# Patient Record
(demographics unavailable — no encounter records)

---

## 2025-02-28 NOTE — ADDENDUM
[FreeTextEntry1] : This note was written by Wendi Ruth, acting as the  for Dr. Whiting. This note accurately reflects the work and decisions made by Dr. Whiting.

## 2025-02-28 NOTE — DISCUSSION/SUMMARY
[FreeTextEntry1] : ADAM is a 48 year female who presents for MATEO. On exam, positive CST, normal PVR, stage II rectocele.  We discussed management options including observation, pelvic floor exercises with or without physical therapy, pessary, impressa, medications including imipramine and surgical management including urethral bulking agents, fascial sling, velazco and midurethral sling with mesh. IUGA information for PFE and MATEO given to patient.   [] Anticipate surgery MUS posterior repair - risks/benefits reviewed [] Pelvic sono ordered [] UDS   All questions answered.

## 2025-02-28 NOTE — OB HISTORY
[Last Pap Smear ___] : date of last pap smear was on [unfilled] [Vaginal ___] : [unfilled] vaginal delivery(s) [Definite ___ (Date)] : the last menstrual period was [unfilled] [Abnormal Pap Smear] : normal pap smear [Taking Estrogens] : is not taking estrogen replacement [Sexually Active] : is not sexually active [FreeTextEntry1] : Largest baby 7lbs 14oz

## 2025-02-28 NOTE — DISCUSSION/SUMMARY
[FreeTextEntry1] : ADAM is a 48 year female who presents for MATEO. On exam, positive CST, normal PVR, stage II rectocele.  We discussed management options including observation, pelvic floor exercises with or without physical therapy, pessary, impressa, medications including imipramine and surgical management including urethral bulking agents, fascial sling, velazco and midurethral sling with mesh. IUGA information for PFE and MATEO given to patient.   [] Anticipate surgery MUS posterior repair - risks/benefits reviewed [] Pelvic sono ordered [] UDS   All questions answered. No Repair - Repaired With Adjacent Surgical Defect Text (Leave Blank If You Do Not Want): After obtaining clear surgical margins the defect was repaired concurrently with another surgical defect which was in close approximation.

## 2025-02-28 NOTE — PHYSICAL EXAM
[Chaperone Present] : A chaperone was present in the examining room during all aspects of the physical examination [No Acute Distress] : in no acute distress [Well developed] : well developed [Well Nourished] : ~L well nourished [Oriented x3] : oriented to person, place, and time [No Edema] : ~T edema was not present [Warm and Dry] : was warm and dry to touch [Labia Majora] : were normal [Labia Minora] : were normal [Normal Appearance] : general appearance was normal [Post Void Residual ____ml] : post void residual was [unfilled] ml [Normal] : normal [Soft] :  the cervix was soft [Uterine Adnexae] : were not tender and not enlarged [Ap ____] : Ap [unfilled] [Bp ____] : Bp [unfilled] [FreeTextEntry2] :  Wendi Ruth [Cough] : no cough [Tenderness] : ~T no ~M abdominal tenderness observed [Distended] : not distended [FreeTextEntry3] : pos CST

## 2025-02-28 NOTE — HISTORY OF PRESENT ILLNESS
[Vaginal Wall Prolapse] : no [Rectal Prolapse] : no [Urinary Frequency] : no [Feelings Of Urinary Urgency] : no [x1] : nocturia once nightly [Urinary Tract Infection] : no [Constipation Obstructed Defecation] : no [Pelvic Pain] : no [Vaginal Pain] : no [Unable To Restrain Bowel Movement] : severe [Stool Visible Blood] : mild [] : years ago [FreeTextEntry1] : ADAM is a 48 year female who presents for urinary incontinence. Reports MATEO for years but has gotten progressively worse. Wears liner during the day. Wears a tampon during the winter d/t increased coughing and leakage which helps. She has nocturia at least 1x a night, Her mom and sisters have bladder prolapse. Hx LEEP with normal paps after. Hx b/l salpingectomy. Denies constipation but has fecal urgency.      Daytime frequency: No Nocturia: Yes at least 1x a night Urinary urgency: No Leakage of urine with urgency: No Leakage of urine with coughing sneezing laughing: Yes Incontinence pad use: Yes Sensation of incomplete bladder emptying: No History of frequent urinary tract infections: No History of hematuria: No Previous treatment: None Vaginal symptoms: Denies pelvic pain, Denies bulge Bowel symptoms: Denies constipation      OB: 2  GYN: LMP 2025, Negative pap 2024 - negative HPV,  Hx HPV  - LEEP done, No estrogen use PMH: Fatty liver, Obesity, Pneumonia, HTN, HLD, Constipation  PSH: LEEP, Tubal ligation, B/l salpingectomy Meds: Atorvastatin, Valsartan  Allx: NKDA

## 2025-02-28 NOTE — PROCEDURE
[Straight Catheterization] : insertion of a straight catheter [Patient] : the patient [___ Fr Straight Tip] : a [unfilled] in [place] straight tip catheter [None] : there were no complications with the catheter insertion [Clear] : clear [No Complications] : no complications [Tolerated Well] : the patient tolerated the procedure well [Post procedure instructions and information given] : Post procedure instructions and information were given and reviewed with patient. [0] : 0 [Stress Incontinence] : stress incontinence

## 2025-02-28 NOTE — PROCEDURE
[Straight Catheterization] : insertion of a straight catheter [Patient] : the patient [___ Fr Straight Tip] : a [unfilled] in [place] straight tip catheter [None] : there were no complications with the catheter insertion [Clear] : clear [No Complications] : no complications [Tolerated Well] : the patient tolerated the procedure well [Post procedure instructions and information given] : Post procedure instructions and information were given and reviewed with patient. [0] : 0 [Stress Incontinence] : stress incontinence

## 2025-04-25 NOTE — DISCUSSION/SUMMARY
[FreeTextEntry1] : ADAM is a 48 year female who presents for f/u on MATEO and POP. Here to discuss POST REPAIR SLING CYSTO scheduled for 05/07/2025.   The patient presented to the office today for counseling regarding her decision for possible pelvic reconstructive surgery. All pertinent prior studies including urodynamic were reviewed. 						   The patient was counseled regarding alternative non-surgical therapies as well as the prognosis with no intervention.      The patient was advised regarding various surgical options including abdominal, robotic/laparoscopic and vaginal approaches. The risks and benefits of surgery using endogenous tissue only versus the use of graft insertion (biologic graft) versus permanent mesh were fully reviewed.  She was informed of the inherent risk of these surgeries including but not limited to erosion, infection, chronic inflammation, acute and chronic pain, pain with intercourse (both of which may be refractory to treatment) fistula, disturbance in bowel or bladder function, any of which may require additional surgery for graft revision.  She is aware that there is no graft to be used in her surgery for the vaginal reconstruction and a permanent mesh will be used for the sling for the leaking of urine.  The patient was advised regarding the July 2011 FDA notification regarding these issues and provided the website address for further reference www.fda.gov.        The general risks of the surgery were reviewed including, but not limited to infection, bleeding, including transfusion, surrounding organ or tissue injury (bladder, rectum, bowel, urethra, ureters, nerves vessels or muscles), failure meaning recurrent prolapse, leaking, voiding dysfunction, needing to go home with a catheter, pain with sex, blood clots, and anesthesia.      The approximate length of the surgery, hospital stay and postoperative recovery period were reviewed, including a general overview of convalescence and postoperative follow-up.      The patient is aware that learner's (medical students/residents/fellows) may be participating in a pelvic exam under anesthesia.      The patient verbalized a desire to proceed with the surgery.  Appropriate informed consent was obtained.  All questions were answered to the patient's satisfaction.   [] Preoperative labs ordered including CBC/BMP/PT/PT/INR [] [facility]  sling/posterior repair, cysto any other indicated procedures

## 2025-04-25 NOTE — HISTORY OF PRESENT ILLNESS
[Vaginal Wall Prolapse] : no [Rectal Prolapse] : mild [Unable To Restrain Bowel Movement] : no [Urinary Frequency] : no [Feelings Of Urinary Urgency] : no [x1] : nocturia once nightly [Urinary Tract Infection] : no [Constipation Obstructed Defecation] : no [Stool Visible Blood] : mild [] : years ago [Pelvic Pain] : no [Vaginal Pain] : no [FreeTextEntry1] : ADAM is a 48 year female who presents for f/u on MATEO and POP. Here to discuss POST REPAIR SLING CYSTO scheduled for 05/07/2025.    UDS with +MATEO, no DO, no UUI on 04/18/2025  Negative pap 04/24/2024 - negative HPV

## 2025-04-25 NOTE — DISCUSSION/SUMMARY
[FreeTextEntry1] : ADAM is a 48 year female who presents for f/u on MATEO and POP. Here to discuss POST REPAIR SLING CYSTO scheduled for 05/07/2025.   The patient presented to the office today for counseling regarding her decision for possible pelvic reconstructive surgery. All pertinent prior studies including urodynamic were reviewed. 						   The patient was counseled regarding alternative non-surgical therapies as well as the prognosis with no intervention.      The patient was advised regarding various surgical options including abdominal, robotic/laparoscopic and vaginal approaches. The risks and benefits of surgery using endogenous tissue only versus the use of graft insertion (biologic graft) versus permanent mesh were fully reviewed.  She was informed of the inherent risk of these surgeries including but not limited to erosion, infection, chronic inflammation, acute and chronic pain, pain with intercourse (both of which may be refractory to treatment) fistula, disturbance in bowel or bladder function, any of which may require additional surgery for graft revision.  She is aware that there is no graft to be used in her surgery for the vaginal reconstruction and a permanent mesh will be used for the sling for the leaking of urine.  The patient was advised regarding the July 2011 FDA notification regarding these issues and provided the website address for further reference www.fda.gov.        The general risks of the surgery were reviewed including, but not limited to infection, bleeding, including transfusion, surrounding organ or tissue injury (bladder, rectum, bowel, urethra, ureters, nerves vessels or muscles), failure meaning recurrent prolapse, leaking, voiding dysfunction, needing to go home with a catheter, pain with sex, blood clots, and anesthesia.      The approximate length of the surgery, hospital stay and postoperative recovery period were reviewed, including a general overview of convalescence and postoperative follow-up.      The patient is aware that learner's (medical students/residents/fellows) may be participating in a pelvic exam under anesthesia.      The patient verbalized a desire to proceed with the surgery.  Appropriate informed consent was obtained.  All questions were answered to the patient's satisfaction.   [] Preoperative labs ordered including CBC/BMP/PT/PT/INR [] [facility]  sling/posterior repair, cysto any other indicated procedures

## 2025-04-25 NOTE — PHYSICAL EXAM
[MA] : MA [No Acute Distress] : in no acute distress [Well developed] : well developed [Well Nourished] : ~L well nourished [Oriented x3] : oriented to person, place, and time [FreeTextEntry2] :  Wendi Ruth

## 2025-05-19 NOTE — ASSESSMENT
[FreeTextEntry1] : 47y/o female s/p sling cystoscopy and posterior repair with Dr. Whiting at the [facility] on May 7,2025 op report reviewed all questions answered. pt is doing well next post op appointment confirmed pt to call if any additional questions or concerns arise.

## 2025-05-19 NOTE — OBJECTIVE
[Post Void Residual ____ ml] : Post Void Residual was [unfilled] ml [Soft and Nontender] : soft and nontender [Clean, Dry, Intact] : Clean, Dry, Intact [Good Support] : Good support [FreeTextEntry3] : no active bleeding , sutures intact, dermabond on left mons no discharge no foul smell no erosion or exposure + tenderness to plapation around perineium

## 2025-05-19 NOTE — ASSESSMENT
[FreeTextEntry1] : 49y/o female s/p sling cystoscopy and posterior repair with Dr. Whiting at the [facility] on May 7,2025 op report reviewed all questions answered. pt is doing well next post op appointment confirmed pt to call if any additional questions or concerns arise.

## 2025-05-19 NOTE — SUBJECTIVE
[FreeTextEntry1] : appears well no distress  [FreeTextEntry8] : no changes  [FreeTextEntry7] : to rectal area taking tylenol  [FreeTextEntry6] : good [FreeTextEntry5] : no leakage, no dysuria, empties well no urge incontinence no  [FreeTextEntry4] : +bm taking miralax, last bm this am  [FreeTextEntry3] : good  [FreeTextEntry2] : unchanged

## 2025-06-27 NOTE — END OF VISIT
[FreeTextEntry3] : I have reviewed and agree with the nurse the findings and edited where appropriate

## 2025-06-27 NOTE — OBJECTIVE
[Post Void Residual ____ ml] : Post Void Residual was [unfilled] ml [Soft and Nontender] : soft and nontender [Clean, Dry, Intact] : Clean, Dry, Intact [Good Support] : Good support [Healing well] : healing well [No Masses or Tenderness] : no masses or tenderness [FreeTextEntry3] : + blood in vault (menses) suture line in tact no suture noted non tender

## 2025-06-27 NOTE — SUBJECTIVE
[FreeTextEntry1] : appears well no distress  [FreeTextEntry8] : no changes  [FreeTextEntry7] : denied  [FreeTextEntry6] : good  [FreeTextEntry5] : no leakage of urine with laugh cough or sneeze good urine flow empties well no foul smell no urgency no urge incontinence  [FreeTextEntry4] : last bm this am soft  [FreeTextEntry3] : good  [FreeTextEntry2] : unchanged

## 2025-07-02 NOTE — PHYSICAL EXAM
[Chaperoned Physical Exam] : A chaperone was present in the examining room during all aspects of the physical examination. [MA] : MA [FreeTextEntry2] : dm [Appropriately responsive] : appropriately responsive [Alert] : alert [No Acute Distress] : no acute distress [No Lymphadenopathy] : no lymphadenopathy [Regular Rate Rhythm] : regular rate rhythm [No Murmurs] : no murmurs [Clear to Auscultation B/L] : clear to auscultation bilaterally [Soft] : soft [Non-tender] : non-tender [Non-distended] : non-distended [No HSM] : No HSM [No Lesions] : no lesions [No Mass] : no mass [Oriented x3] : oriented x3 [Examination Of The Breasts] : a normal appearance [No Masses] : no breast masses were palpable [Labia Majora] : normal [Labia Minora] : normal [Scant] : There was scant vaginal bleeding [Old Blood] : old blood was present in the vagina [Normal] : normal [Uterine Adnexae] : normal